# Patient Record
Sex: FEMALE | Race: WHITE | Employment: FULL TIME | URBAN - METROPOLITAN AREA
[De-identification: names, ages, dates, MRNs, and addresses within clinical notes are randomized per-mention and may not be internally consistent; named-entity substitution may affect disease eponyms.]

---

## 2019-10-01 ENCOUNTER — TRANSFERRED RECORDS (OUTPATIENT)
Dept: HEALTH INFORMATION MANAGEMENT | Facility: CLINIC | Age: 24
End: 2019-10-01

## 2019-10-01 LAB — PAP SMEAR - HIM PATIENT REPORTED: NEGATIVE

## 2019-10-28 ENCOUNTER — TRANSFERRED RECORDS (OUTPATIENT)
Dept: HEALTH INFORMATION MANAGEMENT | Facility: CLINIC | Age: 24
End: 2019-10-28

## 2019-10-28 LAB
CREAT SERPL-MCNC: 0.6 MG/DL (ref 0.6–1.2)
GLUCOSE SERPL-MCNC: 85 MG/DL (ref 74–106)
HPV ABSTRACT: ABNORMAL
PAP-ABSTRACT: NORMAL
POTASSIUM SERPL-SCNC: 3.8 MEQ/L (ref 3.5–5.1)
TSH SERPL-ACNC: 1.73 ULU/ML (ref 0.36–5.93)

## 2019-11-04 ENCOUNTER — TRANSFERRED RECORDS (OUTPATIENT)
Dept: HEALTH INFORMATION MANAGEMENT | Facility: CLINIC | Age: 24
End: 2019-11-04

## 2019-11-04 LAB
CHLAMYDIA - HIM PATIENT REPORTED: NEGATIVE
CHOLEST SERPL-MCNC: 183 MG/DL (ref 150–200)
HDLC SERPL-MCNC: 66 MG/DL (ref 40–59)
LDLC SERPL CALC-MCNC: 102 MG/DL
TRIGL SERPL-MCNC: 75 MG/DL (ref 30–150)

## 2020-03-16 ENCOUNTER — TRANSFERRED RECORDS (OUTPATIENT)
Dept: HEALTH INFORMATION MANAGEMENT | Facility: CLINIC | Age: 25
End: 2020-03-16

## 2020-06-01 ENCOUNTER — TRANSFERRED RECORDS (OUTPATIENT)
Dept: HEALTH INFORMATION MANAGEMENT | Facility: CLINIC | Age: 25
End: 2020-06-01

## 2020-12-02 ENCOUNTER — OFFICE VISIT (OUTPATIENT)
Dept: FAMILY MEDICINE | Facility: CLINIC | Age: 25
End: 2020-12-02
Payer: COMMERCIAL

## 2020-12-02 VITALS
DIASTOLIC BLOOD PRESSURE: 72 MMHG | TEMPERATURE: 98.5 F | BODY MASS INDEX: 22.82 KG/M2 | OXYGEN SATURATION: 95 % | HEIGHT: 65 IN | SYSTOLIC BLOOD PRESSURE: 118 MMHG | WEIGHT: 137 LBS | HEART RATE: 102 BPM

## 2020-12-02 DIAGNOSIS — B36.0 TINEA VERSICOLOR: ICD-10-CM

## 2020-12-02 DIAGNOSIS — Z30.41 ENCOUNTER FOR SURVEILLANCE OF CONTRACEPTIVE PILLS: Primary | ICD-10-CM

## 2020-12-02 PROCEDURE — 99213 OFFICE O/P EST LOW 20 MIN: CPT | Performed by: INTERNAL MEDICINE

## 2020-12-02 RX ORDER — NORETHINDRONE ACETATE AND ETHINYL ESTRADIOL .02; 1 MG/1; MG/1
TABLET ORAL
Qty: 84 TABLET | Refills: 1 | Status: SHIPPED | OUTPATIENT
Start: 2020-12-02 | End: 2020-12-02

## 2020-12-02 RX ORDER — KETOCONAZOLE 20 MG/ML
SHAMPOO TOPICAL DAILY PRN
Qty: 100 ML | Refills: 0 | Status: SHIPPED | OUTPATIENT
Start: 2020-12-02

## 2020-12-02 RX ORDER — NORETHINDRONE ACETATE AND ETHINYL ESTRADIOL .02; 1 MG/1; MG/1
TABLET ORAL
COMMUNITY
Start: 2020-09-28 | End: 2020-12-02

## 2020-12-02 RX ORDER — NORETHINDRONE ACETATE AND ETHINYL ESTRADIOL .02; 1 MG/1; MG/1
TABLET ORAL
Qty: 84 TABLET | Refills: 1 | Status: SHIPPED | OUTPATIENT
Start: 2020-12-02

## 2020-12-02 SDOH — HEALTH STABILITY: MENTAL HEALTH: HOW OFTEN DO YOU HAVE 6 OR MORE DRINKS ON ONE OCCASION?: NOT ASKED

## 2020-12-02 SDOH — HEALTH STABILITY: MENTAL HEALTH: HOW OFTEN DO YOU HAVE A DRINK CONTAINING ALCOHOL?: NOT ASKED

## 2020-12-02 SDOH — HEALTH STABILITY: MENTAL HEALTH: HOW MANY STANDARD DRINKS CONTAINING ALCOHOL DO YOU HAVE ON A TYPICAL DAY?: NOT ASKED

## 2020-12-02 ASSESSMENT — MIFFLIN-ST. JEOR: SCORE: 1367.31

## 2020-12-02 NOTE — PROGRESS NOTES
"Pap smear done 10/28/2019 - NIL, positive other HPV    Mass on lower abdomen, ultrasound 3/2020 showed no abnormalities at site of lump     Subjective     Giorgio Garcia is a 25 year old female who presents to clinic today for the following health issues:    HPI         Concern - Contraception, Mass on stomach, White patches bilateral shoulder    Giorgio recently moved to [place], she works for the [employer].  She was living in [place], actually moving back there soon.  Decided to try things out here for her career, but realized this isn't the place for her.    Needs a refill of birth control.  She has been on Microgestin for several years and this works well.  She is having regular periods without spotting.    She has a small lump on her lower abdomen which has been there for a while.  She had an ultrasound previously and ultrasound did not actually show any abnormality.  It has been more bothersome.  She plans to get this taken care of when she moves back to [place].    She has some wider patches of skin on both her shoulders.  This was more prominent this summer when she was more tanned.  It is not otherwise bothersome, not flaky or itchy or painful.  She did have tinea versicolor number of years ago.        Review of Systems   Const, skin, gu reviewed,  otherwise negative unless noted above.        Objective    /72   Pulse 102   Temp 98.5  F (36.9  C) (Tympanic)   Ht 1.651 m (5' 5\")   Wt 62.1 kg (137 lb)   LMP 11/25/2020   SpO2 95%   BMI 22.80 kg/m    Body mass index is 22.8 kg/m .  Physical Exam   GENERAL: healthy, alert and no distress  SKIN: mildly hypopigmented macules on superior shoulders, L>R   PSYCH: mentation appears normal, affect normal/bright            Assessment & Plan     Encounter for surveillance of contraceptive pills  Refill ordered. She had a pap last year in [place].   - norethindrone-ethinyl estradiol (MICROGESTIN 1/20) 1-20 MG-MCG tablet; TK 1 T PO QD    Tinea " versicolor  Likely tinea versicolor   - ketoconazole (NIZORAL) 2 % external shampoo; Apply topically daily as needed for itching or irritation Wet and later, then apply to affected area, leave on for 5 minutes and then rinse off. Can re-apply in 1 week if needed.            Return in about 2 months (around 2/2/2021) for in [place] .    Nikole Odonnell MD  [facility]